# Patient Record
Sex: FEMALE | Race: BLACK OR AFRICAN AMERICAN | Employment: PART TIME | URBAN - METROPOLITAN AREA
[De-identification: names, ages, dates, MRNs, and addresses within clinical notes are randomized per-mention and may not be internally consistent; named-entity substitution may affect disease eponyms.]

---

## 2017-01-05 RX ORDER — LORAZEPAM 0.5 MG/1
TABLET ORAL
Qty: 30 TABLET | Refills: 1 | Status: SHIPPED | OUTPATIENT
Start: 2017-01-05 | End: 2017-03-07 | Stop reason: SDUPTHER

## 2017-01-25 ENCOUNTER — OFFICE VISIT (OUTPATIENT)
Dept: PRIMARY CARE CLINIC | Age: 47
End: 2017-01-25

## 2017-01-25 VITALS
BODY MASS INDEX: 46.78 KG/M2 | DIASTOLIC BLOOD PRESSURE: 93 MMHG | HEART RATE: 91 BPM | HEIGHT: 64 IN | WEIGHT: 274 LBS | SYSTOLIC BLOOD PRESSURE: 154 MMHG

## 2017-01-25 DIAGNOSIS — R79.89 ELEVATED TSH: Primary | ICD-10-CM

## 2017-01-25 DIAGNOSIS — I10 ESSENTIAL HYPERTENSION: ICD-10-CM

## 2017-01-25 DIAGNOSIS — F32.A DEPRESSION, UNSPECIFIED DEPRESSION TYPE: ICD-10-CM

## 2017-01-25 DIAGNOSIS — K21.9 GASTROESOPHAGEAL REFLUX DISEASE WITHOUT ESOPHAGITIS: ICD-10-CM

## 2017-01-25 LAB
A/G RATIO: 1.5 (ref 1.1–2.2)
ALBUMIN SERPL-MCNC: 4.2 G/DL (ref 3.4–5)
ALP BLD-CCNC: 56 U/L (ref 40–129)
ALT SERPL-CCNC: 24 U/L (ref 10–40)
ANION GAP SERPL CALCULATED.3IONS-SCNC: 11 MMOL/L (ref 3–16)
AST SERPL-CCNC: 16 U/L (ref 15–37)
BILIRUB SERPL-MCNC: 0.4 MG/DL (ref 0–1)
BUN BLDV-MCNC: 12 MG/DL (ref 7–20)
CALCIUM SERPL-MCNC: 9.3 MG/DL (ref 8.3–10.6)
CHLORIDE BLD-SCNC: 101 MMOL/L (ref 99–110)
CO2: 29 MMOL/L (ref 21–32)
CREAT SERPL-MCNC: 0.8 MG/DL (ref 0.6–1.1)
GFR AFRICAN AMERICAN: >60
GFR NON-AFRICAN AMERICAN: >60
GLOBULIN: 2.8 G/DL
GLUCOSE BLD-MCNC: 99 MG/DL (ref 70–99)
POTASSIUM SERPL-SCNC: 3.4 MMOL/L (ref 3.5–5.1)
SODIUM BLD-SCNC: 141 MMOL/L (ref 136–145)
TOTAL PROTEIN: 7 G/DL (ref 6.4–8.2)
TSH REFLEX FT4: 3.16 UIU/ML (ref 0.27–4.2)

## 2017-01-25 PROCEDURE — 99213 OFFICE O/P EST LOW 20 MIN: CPT | Performed by: NURSE PRACTITIONER

## 2017-01-25 ASSESSMENT — ENCOUNTER SYMPTOMS
CONSTIPATION: 0
DIARRHEA: 0
ABDOMINAL DISTENTION: 0
RESPIRATORY NEGATIVE: 1
BLOOD IN STOOL: 0
VOMITING: 0

## 2017-03-10 ENCOUNTER — HOSPITAL ENCOUNTER (OUTPATIENT)
Dept: WOMENS IMAGING | Age: 47
Discharge: HOME | End: 2017-03-10
Attending: NURSE PRACTITIONER | Admitting: NURSE PRACTITIONER

## 2017-03-10 DIAGNOSIS — Z12.31 VISIT FOR SCREENING MAMMOGRAM: ICD-10-CM

## 2017-04-12 ENCOUNTER — TELEPHONE (OUTPATIENT)
Dept: PRIMARY CARE CLINIC | Age: 47
End: 2017-04-12

## 2017-04-12 RX ORDER — LORAZEPAM 0.5 MG/1
TABLET ORAL
Qty: 30 TABLET | Refills: 0 | Status: SHIPPED | OUTPATIENT
Start: 2017-04-12 | End: 2017-05-09 | Stop reason: SDUPTHER

## 2017-04-18 DIAGNOSIS — I10 ESSENTIAL HYPERTENSION: ICD-10-CM

## 2017-04-18 DIAGNOSIS — F32.A DEPRESSION, UNSPECIFIED DEPRESSION TYPE: ICD-10-CM

## 2017-04-18 RX ORDER — LOSARTAN POTASSIUM AND HYDROCHLOROTHIAZIDE 12.5; 1 MG/1; MG/1
TABLET ORAL
Qty: 30 TABLET | Refills: 3 | Status: SHIPPED | OUTPATIENT
Start: 2017-04-18

## 2017-04-18 RX ORDER — CITALOPRAM 20 MG/1
TABLET ORAL
Qty: 30 TABLET | Refills: 3 | Status: SHIPPED | OUTPATIENT
Start: 2017-04-18

## 2017-04-18 RX ORDER — AMLODIPINE BESYLATE 10 MG/1
TABLET ORAL
Qty: 30 TABLET | Refills: 3 | Status: SHIPPED | OUTPATIENT
Start: 2017-04-18

## 2017-06-07 ENCOUNTER — OFFICE VISIT (OUTPATIENT)
Dept: PRIMARY CARE CLINIC | Age: 47
End: 2017-06-07

## 2017-06-07 VITALS
TEMPERATURE: 99.3 F | SYSTOLIC BLOOD PRESSURE: 137 MMHG | DIASTOLIC BLOOD PRESSURE: 77 MMHG | OXYGEN SATURATION: 96 % | WEIGHT: 280.6 LBS | HEIGHT: 64 IN | HEART RATE: 87 BPM | RESPIRATION RATE: 18 BRPM | BODY MASS INDEX: 47.91 KG/M2

## 2017-06-07 DIAGNOSIS — R35.0 URINE FREQUENCY: ICD-10-CM

## 2017-06-07 DIAGNOSIS — J01.10 ACUTE NON-RECURRENT FRONTAL SINUSITIS: Primary | ICD-10-CM

## 2017-06-07 PROCEDURE — 99213 OFFICE O/P EST LOW 20 MIN: CPT | Performed by: NURSE PRACTITIONER

## 2017-06-07 RX ORDER — SULFAMETHOXAZOLE AND TRIMETHOPRIM 800; 160 MG/1; MG/1
1 TABLET ORAL 2 TIMES DAILY
Qty: 20 TABLET | Refills: 0 | Status: SHIPPED | OUTPATIENT
Start: 2017-06-07 | End: 2017-06-17

## 2017-06-07 ASSESSMENT — ENCOUNTER SYMPTOMS
RHINORRHEA: 0
ABDOMINAL DISTENTION: 0
DIARRHEA: 0
CONSTIPATION: 0
SINUS PRESSURE: 1
RESPIRATORY NEGATIVE: 1
BLOOD IN STOOL: 0
SORE THROAT: 0
VOMITING: 0

## 2017-11-01 DIAGNOSIS — K21.9 GASTROESOPHAGEAL REFLUX DISEASE WITHOUT ESOPHAGITIS: ICD-10-CM

## 2017-11-01 RX ORDER — OMEPRAZOLE 40 MG/1
40 CAPSULE, DELAYED RELEASE ORAL DAILY
Qty: 30 CAPSULE | Refills: 3 | Status: SHIPPED | OUTPATIENT
Start: 2017-11-01

## 2018-03-12 ENCOUNTER — HOSPITAL ENCOUNTER (OUTPATIENT)
Dept: WOMENS IMAGING | Age: 48
Discharge: HOME | End: 2018-03-12
Attending: NURSE PRACTITIONER | Admitting: NURSE PRACTITIONER

## 2018-03-12 DIAGNOSIS — Z12.31 VISIT FOR SCREENING MAMMOGRAM: ICD-10-CM

## 2019-03-28 ENCOUNTER — HOSPITAL ENCOUNTER (OUTPATIENT)
Dept: WOMENS IMAGING | Age: 49
Discharge: HOME | End: 2019-03-28
Payer: COMMERCIAL

## 2019-03-28 DIAGNOSIS — Z12.39 BREAST CANCER SCREENING: ICD-10-CM

## 2019-03-28 DIAGNOSIS — Z80.3 FAMILY HISTORY OF MALIGNANT NEOPLASM OF BREAST: ICD-10-CM

## 2019-03-28 PROCEDURE — 77063 BREAST TOMOSYNTHESIS BI: CPT

## 2019-12-18 ENCOUNTER — HOSPITAL ENCOUNTER (OUTPATIENT)
Dept: VASCULAR LAB | Age: 49
Discharge: HOME | End: 2019-12-18
Payer: COMMERCIAL

## 2019-12-18 PROCEDURE — 93922 UPR/L XTREMITY ART 2 LEVELS: CPT

## 2020-01-17 ENCOUNTER — HOSPITAL ENCOUNTER (OUTPATIENT)
Age: 50
Discharge: HOME | End: 2020-01-17
Payer: COMMERCIAL

## 2020-01-17 ENCOUNTER — HOSPITAL ENCOUNTER (OUTPATIENT)
Dept: GENERAL RADIOLOGY | Age: 50
Discharge: HOME | End: 2020-01-17
Payer: COMMERCIAL

## 2020-01-17 PROCEDURE — 73502 X-RAY EXAM HIP UNI 2-3 VIEWS: CPT

## 2020-01-17 PROCEDURE — 73560 X-RAY EXAM OF KNEE 1 OR 2: CPT

## 2020-01-17 PROCEDURE — 73590 X-RAY EXAM OF LOWER LEG: CPT

## 2020-03-02 ENCOUNTER — HOSPITAL ENCOUNTER (OUTPATIENT)
Dept: PHYSICAL THERAPY | Age: 50
Setting detail: THERAPIES SERIES
Discharge: HOME | End: 2020-03-02
Payer: COMMERCIAL

## 2020-03-02 NOTE — FLOWSHEET NOTE
Physical Therapy  Cancellation/No-show Note  Patient Name:  Mckay Millan  :  1970   Date:  3/2/2020  Cancelled visits to date: 1 3/1/20 Initial Evaluation  No-shows to date: 0    For today's appointment patient:  [x]  Cancelled  []  Rescheduled appointment  []  No-show     Reason given by patient:  []  Patient ill  []  Conflicting appointment  []  No transportation    []  Conflict with work  [x]  No reason given  []  Other:     Comments:      Electronically signed by:  Scot Eisenmenger, PT

## 2020-03-05 ENCOUNTER — HOSPITAL ENCOUNTER (OUTPATIENT)
Dept: PHYSICAL THERAPY | Age: 50
Setting detail: THERAPIES SERIES
Discharge: HOME | End: 2020-03-05
Payer: COMMERCIAL

## 2020-03-05 PROCEDURE — 97163 PT EVAL HIGH COMPLEX 45 MIN: CPT

## 2020-03-05 PROCEDURE — 97110 THERAPEUTIC EXERCISES: CPT

## 2020-03-05 PROCEDURE — 97140 MANUAL THERAPY 1/> REGIONS: CPT

## 2020-03-05 NOTE — FLOWSHEET NOTE
Physical Therapy Daily Treatment Note  Date:  3/5/2020    Patient Name:  Anshu Gibson    :  1970  MRN: [id number]    Restrictions/Precautions:  Position Activity Restriction  Other position/activity restrictions: Not a fall risk  Pertinent Medical History: Additional Pertinent Hx: HTN    Medical/Treatment Diagnosis Information:  · Diagnosis: Right leg pain  · Treatment Diagnosis: Right posterior rotated innominate and LS faecet irritation limiting tolerance to standing/ walking activities    Insurance/Certification information:  [facility] [place]  Physician Information:  Referring Practitioner: Dr Katharine Olvera of care signed (Y/N):  Routed 3/5/20    Visit# / total visits:  1  Pain level: 8-10/10     Functional Outcomes Measure:  Test:   Score:    Progress Note: []  Yes  []  No  Next due by: Visit #10      History of Injury:Gradual onset of right buttocks, down RLE to big toe, pain has changed little over time, 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    Subjective:  c/o constant pain from right buttocks to toe (( sharp, shooting, tightens up when )), -10/10, Increased pain with, crossing right leg over left, standing and walking decreased pain with with rest, sleep is disturbeded nightly due to pain     Objective:   Observation:    Test measurements:      Exercises:  Exercise/Equipment Resistance/Repetitions Other comments                       Mat ex     PPT  PPT with      SL'ing hip abd                              Neutral Spine (NS) activity     seated     standing     lifting           HEP     Piriformis stretch  Supine hook lying abd/add  Clam shells  Supine rectus fem. Stretch  30\" x 3-4    30 x  30 x    30\" 3-4 x 2-3 x daily    3/5/20                    Other Therapeutic Activities:      Home Exercise Program:    3/5/20 ; The patient demonstrated good tolerance to and understanding of the HEP.  Written

## 2020-03-05 NOTE — PLAN OF CARE
Outpatient Physical Therapy  [] [facility]    Phone: [phone]   Fax: [phone]   [x] [facility]  Phone: [phone]              Fax: [phone]  [] [facility]   Phone: [phone]   Fax: [phone]     To: Referring Practitioner: Dr Jorge Ramsay      Patient: Colonel Avendaño   : 1970   MRN: [id number]  Evaluation Date: 3/5/2020      Diagnosis Information:  · Diagnosis: Right leg pain   · Treatment Diagnosis: Right posterior rotated innominate and LS faecet irritation limiting tolerance to standing/ walking activities     Physical Therapy Certification/Re-Certification Form  Dear Carolina Da Silva  The following patient has been evaluated for physical therapy services and for therapy to continue, Medicare requires monthly physician review of the treatment plan. Please review the attached evaluation and/or summary of the patient's plan of care, and verify that you agree therapy should continue by signing the attached document and sending it back to our office. Plan of Care/Treatment to date:  [x] Therapeutic Exercise    [x] Modalities:  [x] Therapeutic Activity     [x] Ultrasound  [x] Electrical Stimulation  [] Gait Training      [] Cervical Traction [x] Lumbar Traction  [x] Neuromuscular Re-education    [x] Cold/hotpack [] Iontophoresis   [x] Instruction in HEP     Other:  [] Manual Therapy      []             [] Aquatic Therapy      []           ? Frequency/Duration:  # Days per week: [] 1 day # Weeks: [] 1 week [] 5 weeks     [x] 2 days? [] 2 weeks [x] 6 weeks     [x] 3 days   [] 3 weeks [] 7 weeks     [] 4 days   [x] 4 weeks [] 8 weeks    Rehab Potential: [] Excellent [x] Good [x] Fair  [] Poor       Electronically signed by:  Cash Hager PT      If you have any questions or concerns, please don't hesitate to call.   Thank you for your referral.      Physician Signature:________________________________Date:__________________  By signing above, therapists plan is approved by

## 2020-03-05 NOTE — PROGRESS NOTES
Physical Therapy  Initial Assessment  Date: 3/5/2020  Patient Name: Mckay Millan  MRN: [id number]  : 1970     Treatment Diagnosis: Right posterior rotated innominate and LS faecet irritation limiting tolerance to standing/ walking activities    Restrictions  Position Activity Restriction  Other position/activity restrictions: Not a fall risk    Subjective   General  Chart Reviewed: Yes  Additional Pertinent Hx: HTN  Referring Practitioner: Dr Nando Barraza  Referral Date : 02/10/20  Diagnosis: Right leg pain  General Comment  Comments: Gradual onset of right buttocks, down RLE to big toe, pain has changed little over time, 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    Body Mechanics: Marked sway back    AROM RLE (degrees)  RLE AROM: WFL  RLE General AROM: hip IR and flexion are a little stiff  AROM LLE (degrees)  LLE AROM : WNL  Spine  Lumbar: All ranges are wfl's ,  increased c/o pain with right lateral flexion and right rotation  Special Tests: SIJ mobility FB'ing R<L, supine to sit RLE lengthens,  SLR ?+ at RLE,  Quadrant test at Right increases RLE pain  Joint Mobility  Spine: L4 Left rotated in Ext  ROM RLE: mild hypomobility at hip    Strength RLE  Strength RLE: WFL  Comment: mild weakness at hip ext and abd 4+/5 each  Strength LLE  Strength LLE: WNL     Additional Measures  Flexibility: tight right

## 2020-03-05 NOTE — PROGRESS NOTES
Physical Therapy  Initial Assessment  Date: 3/5/2020  Patient Name: Anshu Gibson  MRN: [id number]  : 1970     Treatment Diagnosis: Right posterior rotated innominate and LS faecet irritation limiting tolerance to standing/ walking activities    Restrictions  Position Activity Restriction  Other position/activity restrictions: Not a fall risk    Subjective   General  Chart Reviewed: Yes  Additional Pertinent Hx: HTN  Referring Practitioner: Dr Pricilla Mccauley  Referral Date : 02/10/20  Diagnosis: Right leg pain  General Comment  Comments: Gradual onset of right buttocks, down RLE to big toe, pain has changed little over time, 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    Body Mechanics: Marked sway back    AROM RLE (degrees)  RLE AROM: WFL  RLE General AROM: hip IR and flexion are a little stiff  AROM LLE (degrees)  LLE AROM : WNL  Spine  Lumbar: All ranges are wfl's ,  increased c/o pain with right lateral flexion and right rotation  Special Tests: SIJ mobility FB'ing R<L, supine to sit RLE lengthens,  SLR ?+ at RLE,  Quadrant test at Right increases RLE pain  Joint Mobility  Spine: L4 Left rotated in Ext  ROM RLE: mild hypomobility at hip    Strength RLE  Strength RLE: WFL  Comment: mild weakness at hip ext and abd 4+/5 each  Strength LLE  Strength LLE: WNL     Additional Measures  Flexibility: tight right piriformis and abd  Other: DTR's 2+/3 at Bilat quads and achilles                                             Assessment   Conditions Requiring Skilled Therapeutic Intervention  Body structures, Functions, Activity limitations: Decreased strength; Increased pain;Decreased ROM; Decreased ADL status  Assessment: CLOF pt is independent   Treatment Diagnosis: Right posterior rotated innominate and LS faecet irritation limiting tolerance to standing/ walking activities  Prognosis: Fair;Good  Decision Making: High Complexity  REQUIRES PT FOLLOW UP: Yes         Plan   Plan  Times per week: continue PT 2-3 x weekly for 4-6 weeks  Current Treatment Recommendations: Strengthening, Modalities, Neuromuscular Re-education, Manual Therapy - Joint Manipulation, Manual Therapy - Soft Tissue Mobilization, Home Exercise Program    G-Code       OutComes Score  LEFS Total Score: 39 (03/05/20 1017)                                               AM-PAC Score             Goals  Short term goals  Time Frame for Short term goals: 2-3 weeks  Short term goal 1: Right SIJ mobility = Left to facilitate ease of mobility  Long term goals  Time Frame for Long term goals : 4-6 weeks  Long term goal 1: decrease c/o pain 80% or better with standing and walking activity  Long term goal 2: All LS ranges WNL's and free of increased pain to facilitate pt's goal  Long term goal 3: 5/5 right hip strength to improve tolerance with standing and walking  Patient Goals   Patient goals : \"get better so I can get back to my activity\"        Therapy Time   Individual Concurrent Group Co-treatment   Time In  10:05         Time Out  11:20         Minutes  75                 Elvis Beckham, PT

## 2020-03-11 ENCOUNTER — HOSPITAL ENCOUNTER (OUTPATIENT)
Dept: PHYSICAL THERAPY | Age: 50
Setting detail: THERAPIES SERIES
Discharge: HOME | End: 2020-03-11
Payer: COMMERCIAL

## 2020-03-11 PROCEDURE — 97110 THERAPEUTIC EXERCISES: CPT

## 2020-03-11 PROCEDURE — 97140 MANUAL THERAPY 1/> REGIONS: CPT

## 2020-03-11 NOTE — FLOWSHEET NOTE
Physical Therapy Daily Treatment Note  Date:  3/11/2020    Patient Name:  Ryan Pan    :  1970  MRN: [id number]    Restrictions/Precautions:  Position Activity Restriction  Other position/activity restrictions: Not a fall risk  Pertinent Medical History: Additional Pertinent Hx: HTN    Medical/Treatment Diagnosis Information:  · Diagnosis: Right leg pain  · Treatment Diagnosis: Right posterior rotated innominate and LS faecet irritation limiting tolerance to standing/ walking activities    Insurance/Certification information:  PT Insurance Information: [facility]  Physician Information:  Referring Practitioner: Dr Hamlet Pastrana of care signed (Y/N):  Routed 3/5/20    Visit# / total visits:  2  Pain level: 8-10/10     Functional Outcomes Measure:  Test:   Score:    Progress Note: []  Yes  []  No  Next due by: Visit #10      History of Injury:Gradual onset of right buttocks, down RLE to big toe, pain has changed little over time, 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 Objective:   Observation:    Test measurements:      Exercises:  Exercise/Equipment Resistance/Repetitions Other comments                       Mat ex     PPT  PPT with march     Bridges     SL'ing hip abd     SL'ing positional rotation stretch 1 min x 3 stretching right Stretching right tolerated well,  Stretching left provoked RLE symptoms therefore left rot.  Ceased    BKTC 30\" x 4  Tolerated well                   Neutral Spine (NS) activity     seated     standing     lifting           HEP     Piriformis stretch  Supine hook lying abd/add  Clam shells  Supine rectus fem. Stretch  30\" x 3-4    30 x  30 x    30\" 3-4 x 2-3 x daily    3/5/20, Reviewed 3/11/20 some cues required to correct tech     SL'ing right rotation stretch 1 min x 2 3/11               Other Therapeutic Activities:      Home Exercise Program:    3/5/20 ; The patient demonstrated good tolerance to and understanding of the HEP. Written instructions have been issued. Manual Treatments: 25 min  DTM/ MFR to right gluteals/ piriformis and right LS dorsal pvm's/ QL in left SL'ing  Stretch to right piriformis, rectus fem. And to right LS rotators  Mobs to right innominate ant rotation grade 3  MET      Modalities:   HP x 10 min post rx to LS seated    Progression Towards Functional goals:  [x] Patient is progressing as expected towards functional goals listed. [] Progression is slowed due to complexities listed. [] Progression has been slowed due to co-morbidities. [] Plan just implemented, too soon to assess goals progression  [] Other:    Charges: Therapeutic Exercise:  [] (40284) Provided verbal/tactile cueing for activities to restore or maintain strength, flexibility, endurance, ROM for improvements with self-care, mobility, lifting and ambulation. Neuromuscular Re-Education  [] (96398) Provided verbal/tactile cueing for activities to restore or maintain balance, coordination, kinesthetic sense, posture, motor skill, proprioception for self-care, mobility, lifting, and ambulation. Therapeutic Activities:    [] (41424) Provided verbal/tactile cueing to address functional limitations related to loss of mobility, strength, balance, and coordination.      Gait Training:  [] (70966) Provided training and instruction to the patient for proper postural muscle recruitment and positioning with ambulation re-education     Home Exercise Program:    [] (68027) Reviewed/Progressed HEP activities related to strengthening, flexibility, endurance, ROM for functional self-care, mobility, lifting and Discharge    Plan for Next Session:    Consider PTx, Reassess    ( correct pelvic alignment, improve hip and LS mobility/ stability, decompress facets, teach NS )    Electronically signed by:  Osiel Powers PT

## 2020-03-16 ENCOUNTER — HOSPITAL ENCOUNTER (OUTPATIENT)
Dept: PHYSICAL THERAPY | Age: 50
Setting detail: THERAPIES SERIES
Discharge: HOME | End: 2020-03-16
Payer: COMMERCIAL

## 2020-03-18 ENCOUNTER — HOSPITAL ENCOUNTER (OUTPATIENT)
Dept: PHYSICAL THERAPY | Age: 50
Setting detail: THERAPIES SERIES
Discharge: HOME | End: 2020-03-18
Payer: COMMERCIAL

## 2020-03-18 PROCEDURE — 97035 APP MDLTY 1+ULTRASOUND EA 15: CPT

## 2020-03-18 PROCEDURE — 97012 MECHANICAL TRACTION THERAPY: CPT

## 2020-03-18 PROCEDURE — 97140 MANUAL THERAPY 1/> REGIONS: CPT

## 2020-03-18 NOTE — FLOWSHEET NOTE
abd/add  Clam shells  Supine rectus fem. Stretch  30\" x 3-4    30 x  30 x    30\" 3-4 x 2-3 x daily    3/5/20, Reviewed 3/11/20 some cues required to correct tech     SL'ing right rotation stretch 1 min x 2 3/11               Other Therapeutic Activities:      Home Exercise Program:    3/5/20 ; The patient demonstrated good tolerance to and understanding of the HEP. Written instructions have been issued. Manual Treatments: 15 min  DTM/ MFR to Bilat dorsal LS pvm's  ri  Stretch t  Mobs L5-S1, L4-5 flexion and A-P's grade 3   3  MET      Modalities:   % x 8 min to bilat dorsal LS pvm's  PTx 80# static steps up/ down ea      Progression Towards Functional goals:  [x] Patient is progressing as expected towards functional goals listed. [] Progression is slowed due to complexities listed. [] Progression has been slowed due to co-morbidities. [] Plan just implemented, too soon to assess goals progression  [] Other:    Charges: Therapeutic Exercise:  [] (53008) Provided verbal/tactile cueing for activities to restore or maintain strength, flexibility, endurance, ROM for improvements with self-care, mobility, lifting and ambulation. Neuromuscular Re-Education  [] (21955) Provided verbal/tactile cueing for activities to restore or maintain balance, coordination, kinesthetic sense, posture, motor skill, proprioception for self-care, mobility, lifting, and ambulation. Therapeutic Activities:    [] (57264) Provided verbal/tactile cueing to address functional limitations related to loss of mobility, strength, balance, and coordination.      Gait Training:  [] (23768) Provided training and instruction to the patient for proper postural muscle recruitment and positioning with ambulation re-education     Home Exercise Program:    [] (43045) Reviewed/Progressed HEP activities related to strengthening, flexibility, endurance, ROM for functional self-care, mobility, lifting and ambulation   [] (38820)

## 2020-03-23 ENCOUNTER — APPOINTMENT (OUTPATIENT)
Dept: PHYSICAL THERAPY | Age: 50
End: 2020-03-23
Payer: COMMERCIAL

## 2020-03-25 ENCOUNTER — APPOINTMENT (OUTPATIENT)
Dept: PHYSICAL THERAPY | Age: 50
End: 2020-03-25
Payer: COMMERCIAL

## 2020-03-25 NOTE — PROGRESS NOTES
Physical Therapy Discharge Note  Date: 3/25/2020    Patient Name: Bernardo Foote  : 1970  MRN: [id number]  Restrictions/Precautions:  Position Activity Restriction  Other position/activity restrictions: Not a fall risk  Pertinent Medical History: Additional Pertinent Hx: HTN     Medical/Treatment Diagnosis Information:  · Diagnosis: Right leg pain  · Treatment Diagnosis: Right posterior rotated innominate and LS faecet irritation limiting tolerance to standing/ walking activities     Insurance/Certification information:  [facility]  Physician Information:  Referring Practitioner: Dr Brenda Carr    Dates of Service:3/2/20 to 3/18/20  Total # of Visits:3  Cancel/No Shows to date:2 can    Medicare Cap Total for 2019:    Functional Outcomes Measures: at discharge  Test:  Score:    Treatment to Date:  [x] Therapeutic Exercise  [x] Modalities:  [x] Therapeutic Activity     [x] Ultrasound  [] Electrical Stim   [] Gait Training      [] Cervical Traction    [x] Lumbar Traction  [x] Neuromuscular Re-education  [] Cold/hotpack [] Iontophoresis  [x] Instruction in HEP      Other:  [x] Manual Therapy       []    [] Aquatic Therapy       []                            Significant Findings At Last Visit:    Subjective:3/18/20 Pt reports more stiffness than pain at her low back.            Objective:   Observation:   Test measurements:  See initial eval     Overall Response to Treatment:  [] Patient responded well to treatment and improvement is noted with regards to functional goals              []Patient should continue to improve in reasonable time if they continue HEP              []Patient has plateaued and is no longer responding to skilled PT intervention                        []Patient is getting worse and would benefit from return to referring MD              []Patient unable to adhere to initial POC              [x]Other: DC'd due to [facility] Virus pandemic    Goals:    Goal

## 2020-03-30 ENCOUNTER — APPOINTMENT (OUTPATIENT)
Dept: PHYSICAL THERAPY | Age: 50
End: 2020-03-30
Payer: COMMERCIAL

## 2020-05-27 ENCOUNTER — HOSPITAL ENCOUNTER (OUTPATIENT)
Dept: WOMENS IMAGING | Age: 50
Discharge: HOME | End: 2020-05-27
Payer: COMMERCIAL

## 2020-05-27 PROCEDURE — 77067 SCR MAMMO BI INCL CAD: CPT

## 2021-05-28 ENCOUNTER — HOSPITAL ENCOUNTER (OUTPATIENT)
Dept: WOMENS IMAGING | Age: 51
Discharge: HOME | End: 2021-05-28
Payer: COMMERCIAL

## 2021-05-28 DIAGNOSIS — Z12.31 VISIT FOR SCREENING MAMMOGRAM: ICD-10-CM

## 2021-05-28 PROCEDURE — 77063 BREAST TOMOSYNTHESIS BI: CPT

## 2022-05-31 ENCOUNTER — HOSPITAL ENCOUNTER (OUTPATIENT)
Dept: WOMENS IMAGING | Age: 52
Discharge: HOME | End: 2022-05-31
Payer: COMMERCIAL

## 2022-05-31 DIAGNOSIS — Z12.31 BREAST CANCER SCREENING BY MAMMOGRAM: ICD-10-CM

## 2022-05-31 PROCEDURE — 77063 BREAST TOMOSYNTHESIS BI: CPT

## 2023-06-23 ENCOUNTER — HOSPITAL ENCOUNTER (OUTPATIENT)
Dept: WOMENS IMAGING | Age: 53
Discharge: HOME | End: 2023-06-23
Payer: COMMERCIAL

## 2023-06-23 DIAGNOSIS — Z12.31 BREAST CANCER SCREENING BY MAMMOGRAM: ICD-10-CM

## 2023-06-23 PROCEDURE — 77063 BREAST TOMOSYNTHESIS BI: CPT

## 2024-03-14 RX ORDER — LOSARTAN POTASSIUM 100 MG/1
100 TABLET ORAL DAILY
COMMUNITY

## 2024-03-14 RX ORDER — M-VIT,TX,IRON,MINS/CALC/FOLIC 27MG-0.4MG
1 TABLET ORAL DAILY
COMMUNITY

## 2024-03-14 RX ORDER — HYDROCHLOROTHIAZIDE 12.5 MG/1
12.5 CAPSULE, GELATIN COATED ORAL DAILY
COMMUNITY

## 2024-03-14 NOTE — PROGRESS NOTES
[facility] ENDOSCOPY COLONOSCOPY PRE-OPERATIVE INSTRUCTIONS    Procedure date_3/20/2024________  Arrival time___1030_________          Surgery time__1130__________       Clear liquids the day before the procedure. Do not eat or drink anything within 5 hours of your procedure.    This includes water chewing gum, mints and ice chips.   You may brush your teeth and gargle the morning of your surgery, but do not swallow the water    You may be asked to stop blood thinners such as Coumadin, Plavix, Fragmin, Lovenox, etc., or any anti-inflammatories such as:  Aspirin, Ibuprofen, Advil, Naproxen prior to your procedure.   We also ask that you stop any OTC medications such as fish oil, vitamin E, glucosamine, garlic, Multivitamins, COQ 10, etc.    You must make arrangements for a responsible adult to arrive with you and stay in our waiting area during your procedure.  They will also need to take you home after your procedure.    For your safety you will not be allowed to leave alone or drive yourself home.    Also for your safety, it is strongly suggested that someone stay with you the first 24 hours after your procedure.    For your comfort, please wear simple loose fitting clothing to the center.  Please do not bring valuables.      If you have a living will and a durable power of  for healthcare, please bring in a copy.     You will need to bring a photo ID and insurance card    Our goal is to provide you with excellent care so if you have any questions, please contact us at the [facility] at [phone]         Please note these are generalized instructions for all colonoscopy cases, you may be provided with more specific instructions if necessary

## 2024-03-19 ENCOUNTER — ANESTHESIA EVENT (OUTPATIENT)
Dept: ENDOSCOPY | Age: 54
End: 2024-03-19
Payer: COMMERCIAL

## 2024-03-20 ENCOUNTER — HOSPITAL ENCOUNTER (OUTPATIENT)
Age: 54
Setting detail: OUTPATIENT SURGERY
Discharge: HOME | End: 2024-03-20
Attending: INTERNAL MEDICINE | Admitting: INTERNAL MEDICINE
Payer: COMMERCIAL

## 2024-03-20 ENCOUNTER — ANESTHESIA (OUTPATIENT)
Dept: ENDOSCOPY | Age: 54
End: 2024-03-20
Payer: COMMERCIAL

## 2024-03-20 VITALS
TEMPERATURE: 97 F | HEART RATE: 67 BPM | SYSTOLIC BLOOD PRESSURE: 149 MMHG | BODY MASS INDEX: 43.4 KG/M2 | OXYGEN SATURATION: 100 % | RESPIRATION RATE: 16 BRPM | HEIGHT: 64 IN | DIASTOLIC BLOOD PRESSURE: 77 MMHG | WEIGHT: 254.2 LBS

## 2024-03-20 PROCEDURE — 6360000002 HC RX W HCPCS

## 2024-03-20 PROCEDURE — 2500000003 HC RX 250 WO HCPCS

## 2024-03-20 PROCEDURE — 3609027000 HC COLONOSCOPY: Performed by: INTERNAL MEDICINE

## 2024-03-20 PROCEDURE — 7100000011 HC PHASE II RECOVERY - ADDTL 15 MIN: Performed by: INTERNAL MEDICINE

## 2024-03-20 PROCEDURE — 7100000010 HC PHASE II RECOVERY - FIRST 15 MIN: Performed by: INTERNAL MEDICINE

## 2024-03-20 PROCEDURE — 2580000003 HC RX 258: Performed by: ANESTHESIOLOGY

## 2024-03-20 PROCEDURE — 3700000000 HC ANESTHESIA ATTENDED CARE: Performed by: INTERNAL MEDICINE

## 2024-03-20 PROCEDURE — 3700000001 HC ADD 15 MINUTES (ANESTHESIA): Performed by: INTERNAL MEDICINE

## 2024-03-20 RX ORDER — SODIUM CHLORIDE 9 MG/ML
INJECTION, SOLUTION INTRAVENOUS PRN
Status: CANCELLED | OUTPATIENT
Start: 2024-03-20

## 2024-03-20 RX ORDER — DIPHENHYDRAMINE HYDROCHLORIDE 50 MG/ML
12.5 INJECTION INTRAMUSCULAR; INTRAVENOUS
Status: CANCELLED | OUTPATIENT
Start: 2024-03-20 | End: 2024-03-21

## 2024-03-20 RX ORDER — PROPOFOL 10 MG/ML
INJECTION, EMULSION INTRAVENOUS PRN
Status: DISCONTINUED | OUTPATIENT
Start: 2024-03-20 | End: 2024-03-20 | Stop reason: SDUPTHER

## 2024-03-20 RX ORDER — LIDOCAINE HYDROCHLORIDE 20 MG/ML
INJECTION, SOLUTION EPIDURAL; INFILTRATION; INTRACAUDAL; PERINEURAL PRN
Status: DISCONTINUED | OUTPATIENT
Start: 2024-03-20 | End: 2024-03-20 | Stop reason: SDUPTHER

## 2024-03-20 RX ORDER — SODIUM CHLORIDE 0.9 % (FLUSH) 0.9 %
5-40 SYRINGE (ML) INJECTION EVERY 12 HOURS SCHEDULED
Status: CANCELLED | OUTPATIENT
Start: 2024-03-20

## 2024-03-20 RX ORDER — SODIUM CHLORIDE 0.9 % (FLUSH) 0.9 %
5-40 SYRINGE (ML) INJECTION EVERY 12 HOURS SCHEDULED
Status: DISCONTINUED | OUTPATIENT
Start: 2024-03-20 | End: 2024-03-20 | Stop reason: HOSPADM

## 2024-03-20 RX ORDER — SODIUM CHLORIDE 9 MG/ML
INJECTION, SOLUTION INTRAVENOUS PRN
Status: DISCONTINUED | OUTPATIENT
Start: 2024-03-20 | End: 2024-03-20 | Stop reason: HOSPADM

## 2024-03-20 RX ORDER — ONDANSETRON 2 MG/ML
4 INJECTION INTRAMUSCULAR; INTRAVENOUS
Status: CANCELLED | OUTPATIENT
Start: 2024-03-20 | End: 2024-03-21

## 2024-03-20 RX ORDER — SODIUM CHLORIDE 0.9 % (FLUSH) 0.9 %
5-40 SYRINGE (ML) INJECTION PRN
Status: CANCELLED | OUTPATIENT
Start: 2024-03-20

## 2024-03-20 RX ORDER — SODIUM CHLORIDE 0.9 % (FLUSH) 0.9 %
5-40 SYRINGE (ML) INJECTION PRN
Status: DISCONTINUED | OUTPATIENT
Start: 2024-03-20 | End: 2024-03-20 | Stop reason: HOSPADM

## 2024-03-20 RX ORDER — NALOXONE HYDROCHLORIDE 0.4 MG/ML
INJECTION, SOLUTION INTRAMUSCULAR; INTRAVENOUS; SUBCUTANEOUS PRN
Status: CANCELLED | OUTPATIENT
Start: 2024-03-20

## 2024-03-20 RX ADMIN — PROPOFOL 70 MG: 10 INJECTION, EMULSION INTRAVENOUS at 11:36

## 2024-03-20 RX ADMIN — PROPOFOL 180 MCG/KG/MIN: 10 INJECTION, EMULSION INTRAVENOUS at 11:37

## 2024-03-20 RX ADMIN — LIDOCAINE HYDROCHLORIDE 100 MG: 20 INJECTION, SOLUTION EPIDURAL; INFILTRATION; INTRACAUDAL; PERINEURAL at 11:36

## 2024-03-20 RX ADMIN — SODIUM CHLORIDE: 9 INJECTION, SOLUTION INTRAVENOUS at 11:26

## 2024-03-20 RX ADMIN — PROPOFOL 50 MG: 10 INJECTION, EMULSION INTRAVENOUS at 11:39

## 2024-03-20 ASSESSMENT — PAIN - FUNCTIONAL ASSESSMENT
PAIN_FUNCTIONAL_ASSESSMENT: NONE - DENIES PAIN

## 2024-03-20 ASSESSMENT — LIFESTYLE VARIABLES: SMOKING_STATUS: 0

## 2024-03-20 NOTE — H&P
Pre-operative History and Physical    Patient: Renaldo Alfaro  : 1970  Acct#:     Intended Procedure:  Colonoscopy    HISTORY OF PRESENT ILLNESS:  The patient is a 53 y.o. female  who presents for/due to average risk colon cancer screening       Past Medical History:        Diagnosis Date    Anxiety     Fibroid     GERD (gastroesophageal reflux disease)     Hypertension 10/27/2014     Past Surgical History:        Procedure Laterality Date    HYSTERECTOMY (CERVIX STATUS UNKNOWN)  2013    TUBAL LIGATION       Medications Prior to Admission:   No current facility-administered medications on file prior to encounter.     Current Outpatient Medications on File Prior to Encounter   Medication Sig Dispense Refill    losartan (COZAAR) 100 MG tablet Take 1 tablet by mouth daily      hydroCHLOROthiazide 12.5 MG capsule Take 1 capsule by mouth daily      Multiple Vitamins-Minerals (THERAPEUTIC MULTIVITAMIN-MINERALS) tablet Take 1 tablet by mouth daily      omeprazole (PRILOSEC) 40 MG delayed release capsule TAKE 1 CAPSULE BY MOUTH DAILY 30 capsule 3    LORazepam (ATIVAN) 0.5 MG tablet TAKE 1 TABLET BY MOUTH AT BEDTIME AS NEEDED 30 tablet 0    citalopram (CELEXA) 20 MG tablet TAKE 1 TABLET BY MOUTH DAILY 30 tablet 3    amLODIPine (NORVASC) 10 MG tablet TAKE 1 TABLET BY MOUTH DAILY 30 tablet 3    benzoyl peroxide-erythromycin (BENZAMYCIN) 5-3 % gel APPLY TWICE DAILY 46.6 g 1    clotrimazole-betamethasone (LOTRISONE) 1-0.05 % cream APPLY TOPICALLY 2 TIMES DAILY. 30 g 5        Allergies:  Patient has no known allergies.    Social History:   TOBACCO:   reports that she has never smoked. She has never used smokeless tobacco.  ETOH:   reports current alcohol use.  DRUGS:   reports no history of drug use.    PHYSICAL EXAM:      Vital Signs: BP (!) 153/89   Pulse 81   Temp 96.8 °F (36 °C) (Temporal)   Resp 18   Ht 1.626 m (5' 4\")   Wt 115.3 kg (254 lb 3.2 oz)   LMP 2013   SpO2 100%   BMI 43.63 kg/m²

## 2024-03-20 NOTE — OP NOTE
Colonoscopy Procedure Note      Patient: Renaldo Alfaro  : 1970  Acct#:     Procedure: Colonoscopy with terminal ileum intubation     Date:  3/20/2024    Surgeon:  Lilly Boyle MD    Referring Physician:  Moiz Dahl MD    Previous Colonoscopy: NO  Date: N/A  Greater than 3 years: N/A    Preoperative Diagnosis:    Colon cancer screening, average risk    Postoperative Diagnosis:    Normal terminal ileum   Mild sigmoid diverticulosis   Otherwise, no colon polyps, masses, or lesions   Small internal hemorrhoids     Consent:  The patient or their legal guardian has signed a consent, and is aware of the potential risks, benefits, alternatives, and potential complications of this procedure.  These include, but are not limited to hemorrhage, bleeding, post procedural pain, perforation, phlebitis, aspiration, hypotension, hypoxia, cardiovascular events such as arryhthmia, and possibly death.  Additionally, the possibility of missed colonic polyps and interval colon cancer was discussed in the consent.    Anesthesia:  Agents given by the anesthesia service during MAC    Endoscope used: CF [id number]    Procedure:   An informed consent was obtained from the patient after explanation of indications, benefits, possible risks and complications of the procedure.  The patient was then taken to the endoscopy suite, placed in the left lateral decubitus position, and the above IV anesthesia was administered.    A digital rectal examination was performed and revealed negative without mass, lesions or tenderness.      The patient was placed in the left lateral position and monitored continuously with ECG tracing, pulse oximetry monitoring and direct observations. Medications were administered incrementally over the course of the procedure to achieve an adequate level of conscious sedation. After digital examination of the rectum was performed, the Olympus CF  was inserted into the rectum and advanced under direct

## 2024-03-20 NOTE — ANESTHESIA PRE PROCEDURE
Department of Anesthesiology  Preprocedure Note       Name:  Renaldo Alfaro   Age:  53 y.o.  :  1970                                          MRN:  [id number]         Date:  3/20/2024      Surgeon: Surgeon(s):  Lilly Boyle MD    Procedure: Procedure(s):  COLORECTAL CANCER SCREENING, NOT HIGH RISK    Medications prior to admission:   Prior to Admission medications    Medication Sig Start Date End Date Taking? Authorizing Provider   losartan (COZAAR) 100 MG tablet Take 1 tablet by mouth daily   Yes Rodrigo Junior MD   hydroCHLOROthiazide 12.5 MG capsule Take 1 capsule by mouth daily   Yes ProviderRodrigo MD   Multiple Vitamins-Minerals (THERAPEUTIC MULTIVITAMIN-MINERALS) tablet Take 1 tablet by mouth daily   Yes Rodrigo Junior MD   omeprazole (PRILOSEC) 40 MG delayed release capsule TAKE 1 CAPSULE BY MOUTH DAILY 17   Chico Santacruz MD   LORazepam (ATIVAN) 0.5 MG tablet TAKE 1 TABLET BY MOUTH AT BEDTIME AS NEEDED 17   Monae Allison APRN - CNP   citalopram (CELEXA) 20 MG tablet TAKE 1 TABLET BY MOUTH DAILY 17   Monae Allison APRN - CNP   amLODIPine (NORVASC) 10 MG tablet TAKE 1 TABLET BY MOUTH DAILY 17   Monae Allison APRN - CNP   benzoyl peroxide-erythromycin (BENZAMYCIN) 5-3 % gel APPLY TWICE DAILY 16   Allison, Monae Azalea, APRN - CNP   clotrimazole-betamethasone (LOTRISONE) 1-0.05 % cream APPLY TOPICALLY 2 TIMES DAILY. 10/28/15   Chico Santacruz MD       Current medications:    Current Facility-Administered Medications   Medication Dose Route Frequency Provider Last Rate Last Admin    sodium chloride flush 0.9 % injection 5-40 mL  5-40 mL IntraVENous 2 times per day Alphonse Fields MD        sodium chloride flush 0.9 % injection 5-40 mL  5-40 mL IntraVENous PRN Alphonse Fields MD        0.9 % sodium chloride infusion   IntraVENous PRN Alphonse Fields MD           Allergies:  No Known Allergies    Problem List:    Patient Active

## 2024-03-20 NOTE — ANESTHESIA POSTPROCEDURE EVALUATION
Department of Anesthesiology  Postprocedure Note    Patient: Renaldo Alfaro  MRN: [id number]  YOB: 1970  Date of evaluation: 3/20/2024    Procedure Summary       Date: 03/20/24 Room / Location: [place] [id number] / [facility]    Anesthesia Start: 1130 Anesthesia Stop: 1151    Procedure: COLORECTAL CANCER SCREENING, NOT HIGH RISK Diagnosis:       Screen for colon cancer      (Screen for colon cancer [Z12.11])    Surgeons: Lilly Boyle MD Responsible Provider: Alphonse Fields MD    Anesthesia Type: MAC ASA Status: 3            Anesthesia Type: No value filed.    Talita Phase I: Talita Score: 10    Talita Phase II: Talita Score: 10    Anesthesia Post Evaluation    Patient location during evaluation: bedside  Patient participation: complete - patient participated  Level of consciousness: awake and alert  Pain score: 0  Nausea & Vomiting: no nausea  Cardiovascular status: hemodynamically stable  Respiratory status: acceptable  Hydration status: stable  Pain management: adequate    No notable events documented.

## 2024-03-20 NOTE — DISCHARGE INSTRUCTIONS
Your colonoscopy was notable for very mild diverticulosis. You had no polyps.     Recommendations:  Repeat colonoscopy in 10 years.  Follow up with primary care physician.    Discharge Instructions for Colonoscopy     Colonoscopy is a visual exam of the lining of the large intestine, also called the bowel or colon, with a colonoscope. A colonoscope is a flexible tube with a light and a viewing device. It allows the doctor to view the inside of the colon through a tiny video camera.     Colonoscopy is performed for many reasons: unexplained anemia , pain, diarrhea , bloody stools, cancer screening, among many other reasons.     Complications from a colonoscopy are rare. Some possible serious complications include perforated bowel (which might require surgery) and bleeding (which could require blood transfusion ). Minor complications include bloating, gas, and cramping that can last for 1-2 days after the procedure.     Because air is put into your colon during the procedure, it is normal to pass large amounts of air from your rectum. You may not have a bowel movement for 1-3 days after the procedure.     What You Will Need:  Someone to drive you home after the procedure     Steps to Take:  Home Care -  Rest when you get home.   Because the sedative will make you drowsy, don't drive, operate machinery, or make important decisions the day of the procedure.  Feelings of bloating, gas, or cramping may persist for 24 hours.   Diet -  Try sips of water first. If tolerated, resume bland food (scrambled eggs, toast, soup) first.  If tolerated, resume regular diet or the diet recommended by your physician.   Do not drink alcohol for 24 hours.   Physical Activity -  Ask your doctor when you will be able to return to work.   Do not drive, operate heavy machinery, or do activities that require coordination or balance for 24 hours.   Otherwise, return to your normal routine as soon as you are comfortable to do so, which is

## 2024-08-14 ENCOUNTER — HOSPITAL ENCOUNTER (OUTPATIENT)
Dept: WOMENS IMAGING | Age: 54
Discharge: HOME | End: 2024-08-14
Payer: COMMERCIAL

## 2024-08-14 VITALS — BODY MASS INDEX: 39.44 KG/M2 | HEIGHT: 64 IN | WEIGHT: 231 LBS

## 2024-08-14 DIAGNOSIS — Z12.31 VISIT FOR SCREENING MAMMOGRAM: ICD-10-CM

## 2024-08-14 PROCEDURE — 77063 BREAST TOMOSYNTHESIS BI: CPT

## 2024-08-30 ENCOUNTER — HOSPITAL ENCOUNTER (OUTPATIENT)
Dept: MAMMOGRAPHY | Age: 54
Discharge: HOME | End: 2024-08-30
Payer: COMMERCIAL

## 2024-08-30 ENCOUNTER — HOSPITAL ENCOUNTER (OUTPATIENT)
Dept: ULTRASOUND IMAGING | Age: 54
Discharge: HOME | End: 2024-08-30
Payer: COMMERCIAL

## 2024-08-30 DIAGNOSIS — R92.8 ABNORMAL MAMMOGRAM: ICD-10-CM

## 2024-08-30 PROCEDURE — 76642 ULTRASOUND BREAST LIMITED: CPT

## 2024-08-30 PROCEDURE — G0279 TOMOSYNTHESIS, MAMMO: HCPCS

## 2025-08-18 ENCOUNTER — HOSPITAL ENCOUNTER (OUTPATIENT)
Dept: WOMENS IMAGING | Age: 55
Discharge: HOME | End: 2025-08-18
Payer: COMMERCIAL

## 2025-08-18 DIAGNOSIS — Z12.31 SCREENING MAMMOGRAM FOR BREAST CANCER: ICD-10-CM

## 2025-08-18 PROCEDURE — 77063 BREAST TOMOSYNTHESIS BI: CPT
